# Patient Record
Sex: FEMALE | Race: WHITE
[De-identification: names, ages, dates, MRNs, and addresses within clinical notes are randomized per-mention and may not be internally consistent; named-entity substitution may affect disease eponyms.]

---

## 2017-03-27 ENCOUNTER — HOSPITAL ENCOUNTER (EMERGENCY)
Dept: HOSPITAL 25 - UCCORT | Age: 52
Discharge: HOME | End: 2017-03-27
Payer: COMMERCIAL

## 2017-03-27 VITALS — SYSTOLIC BLOOD PRESSURE: 119 MMHG | DIASTOLIC BLOOD PRESSURE: 78 MMHG

## 2017-03-27 DIAGNOSIS — Z88.1: ICD-10-CM

## 2017-03-27 DIAGNOSIS — N39.0: Primary | ICD-10-CM

## 2017-03-27 DIAGNOSIS — Z88.5: ICD-10-CM

## 2017-03-27 DIAGNOSIS — Z87.440: ICD-10-CM

## 2017-03-27 DIAGNOSIS — R31.9: ICD-10-CM

## 2017-03-27 PROCEDURE — 99212 OFFICE O/P EST SF 10 MIN: CPT

## 2017-03-27 PROCEDURE — 87186 SC STD MICRODIL/AGAR DIL: CPT

## 2017-03-27 PROCEDURE — 87086 URINE CULTURE/COLONY COUNT: CPT

## 2017-03-27 PROCEDURE — 87077 CULTURE AEROBIC IDENTIFY: CPT

## 2017-03-27 PROCEDURE — G0463 HOSPITAL OUTPT CLINIC VISIT: HCPCS

## 2017-03-27 PROCEDURE — 81003 URINALYSIS AUTO W/O SCOPE: CPT

## 2017-03-27 NOTE — UC
Complaint Female HPI





- HPI Summary


HPI Summary: 





complaint of pain with urination that started 2 daysa go


increased frequency and urgency


urine is foul smelling


blood in urine


 currently seeing Dr Mistry urologist for 


frequent UTI


denies vaginal discharge, fever, abdmonial pain, n]back pain


last UTI 1/2017- took cipro


 taking pyridium





- History Of Current Complaint


Chief Complaint: UCGU


Stated Complaint: URINARY COMPLAINT


Time Seen by Provider: 03/27/17 08:20


Hx Obtained From: Patient


Hx Last Menstrual Period: n/a


Onset/Duration: Sudden Onset


Associated Signs And Symptoms: Positive: Negative





- Allergies/Home Medications


Allergies/Adverse Reactions: 


 Allergies











Allergy/AdvReac Type Severity Reaction Status Date / Time


 


Bee Venom Allergy Severe Swelling Verified 03/27/17 08:23


 


Clarithromycin [From Biaxin] AdvReac Unknown Vomiting Verified 03/27/17 08:23


 


Codeine AdvReac  Vomiting Verified 03/27/17 08:23











Home Medications: 


 Home Medications





Phenazopyridine HCl [Uristat] 190 mg PO TID PRN 03/27/17 [History Confirmed 03/ 27/17]











PMH/Surg Hx/FS Hx/Imm Hx


Previously Healthy: Yes - frequent UTI's


Endocrine History Of: 


   Denies: Diabetes


GI/ History Of: 


   Denies: Gastroesophageal Reflux


Psychological History Of: Reports: Anxiety





- Surgical History


Surgical History: Yes


Surgery Procedure, Year, and Place: Uterine Ablation, 2012





- Family History


Known Family History: 


   Negative: Cardiac Disease, Hypertension, Diabetes, Renal Disease





- Social History


Occupation: Employed Full-time


Lives: With Family


Alcohol Use: Rare


Substance Use Type: None


Smoking Status (MU): Never Smoked Tobacco





- Immunization History


Most Recent Influenza Vaccination: Not the 2015/2016 Season





Review of Systems


Constitutional: Negative


Skin: Negative


Eyes: Negative


ENT: Negative


Respiratory: Negative


Cardiovascular: Negative


Gastrointestinal: Negative


Genitourinary: Dysuria, Hematuria, Frequency, Urgency


Motor: Negative


Neurovascular: Negative


Musculoskeletal: Negative


Neurological: Negative


Psychological: Negative


All Other Systems Reviewed And Are Negative: Yes





Physical Exam


Triage Information Reviewed: Yes


Appearance: No Pain Distress, Well-Nourished


Vital Signs: 


 Initial Vital Signs











Temp  97.8 F   03/27/17 08:21


 


Pulse  70   03/27/17 08:21


 


Resp  16   03/27/17 08:21


 


BP  119/78   03/27/17 08:21


 


Pulse Ox  99   03/27/17 08:21











Vital Signs Reviewed: Yes


Eyes: Positive: Conjunctiva Clear


ENT: Positive: Pharynx normal, TMs normal.  Negative: Nasal congestion


Neck: Positive: No Lymphadenopathy


Respiratory: Positive: Lungs clear, Normal breath sounds, No respiratory 

distress


Cardiovascular: Positive: RRR, No Murmur, Pulses Normal


Abdomen Description: Positive: Nontender, No Organomegaly, Soft.  Negative: CVA 

Tenderness (R), CVA Tenderness (L), Distended, Guarding


Bowel Sounds: Positive: Present


Musculoskeletal: Positive: No Edema


Neurological: Positive: Alert


Psychological Exam: Normal


Skin Exam: Normal





 Complaint Female Dx





- Course


Course Of Treatment: exam completed.  last urine culture show suseptability to 

macrobid





- Differential Dx/Diagnosis


Differential Diagnosis/HQI/PQRI: Ureteral Stone, Urinary Tract Infection


Provider Diagnoses: UTI





Discharge





- Discharge Plan


Condition: Stable


Disposition: HOME


Prescriptions: 


Nitrofurantoin Monohyd Macro [Macrobid] 100 mg PO BID #14 cap


Phenazopyridine TAB* [Pyridium 100 mg TAB*] 100 mg PO TID #6 tab


Patient Education Materials:  Urinary Tract Infection in Women (ED)


Referrals: 


Sander Mejía MD [Primary Care Provider] - 


Additional Instructions: 


Start antibiotic and pyridium as directed 


 Increase fluids and rest


 Take acetaminophen for fever or pain


Please review your discharge instructions. 


If your symptoms do not improve please call your primary care provider or 

return to urgent care

## 2017-06-29 ENCOUNTER — HOSPITAL ENCOUNTER (EMERGENCY)
Dept: HOSPITAL 25 - UCCORT | Age: 52
Discharge: HOME | End: 2017-06-29
Payer: COMMERCIAL

## 2017-06-29 VITALS — SYSTOLIC BLOOD PRESSURE: 119 MMHG | DIASTOLIC BLOOD PRESSURE: 75 MMHG

## 2017-06-29 DIAGNOSIS — M79.89: Primary | ICD-10-CM

## 2017-06-29 PROCEDURE — G0463 HOSPITAL OUTPT CLINIC VISIT: HCPCS

## 2017-06-29 PROCEDURE — 73140 X-RAY EXAM OF FINGER(S): CPT

## 2017-06-29 PROCEDURE — 99212 OFFICE O/P EST SF 10 MIN: CPT

## 2017-06-29 PROCEDURE — 86618 LYME DISEASE ANTIBODY: CPT

## 2017-06-29 NOTE — UC
Hand/Wrist HPI





- HPI Summary


HPI Summary: 





left swollen index finger





- History Of Current Complaint


Chief Complaint: UCUpperExtremity


Stated Complaint: SWOLLEN INDEX FINGER-LFT


Time Seen by Provider: 06/29/17 20:56


Hx Obtained From: Patient


Hx Last Menstrual Period: n/a


Pregnant?: No


Mechanism Of Injury: unknown


Onset/Duration: Sudden Onset, Lasting Days - 4, Still Present


Severity Initially: Mild


Severity Currently: Moderate


Pain Intensity: 7


Pain Scale Used: 0-10 Numeric


Character Of Pain: Throbbing


Aggravating Factor(s): Movement


Alleviating: Nothing


Associated Signs And Symptoms: Positive: Swelling, Redness





- Allergies/Home Medications


Allergies/Adverse Reactions: 


 Allergies











Allergy/AdvReac Type Severity Reaction Status Date / Time


 


Bee Venom Allergy Severe Swelling Verified 06/29/17 20:42


 


Clarithromycin [From Biaxin] AdvReac Unknown Vomiting Verified 06/29/17 20:42


 


Codeine AdvReac  Vomiting Verified 06/29/17 20:42














PMH/Surg Hx/FS Hx/Imm Hx


Previously Healthy: Yes





- Surgical History


Surgical History: Yes


Surgery Procedure, Year, and Place: Uterine Ablation, 2012





- Family History


Known Family History: 


   Negative: Cardiac Disease, Hypertension, Diabetes, Renal Disease





- Social History


Occupation: Employed Full-time


Lives: With Family


Alcohol Use: Rare


Substance Use Type: None


Smoking Status (MU): Never Smoked Tobacco





- Immunization History


Most Recent Influenza Vaccination: Not the 2015/2016 Season





Review of Systems


Constitutional: Negative


Skin: Negative


Eyes: Negative


ENT: Negative


Respiratory: Negative


Cardiovascular: Negative


Gastrointestinal: Negative


Genitourinary: Negative


Motor: Negative, Decreased ROM - left index finger


Neurovascular: Negative


Musculoskeletal: Negative, Edema - left index finger


Neurological: Negative


Psychological: Negative


All Other Systems Reviewed And Are Negative: Yes





Physical Exam


Triage Information Reviewed: Yes


Appearance: Well-Appearing, No Pain Distress, Well-Nourished


Vital Signs: 


 Initial Vital Signs











Temp  97.8 F   06/29/17 20:36


 


Pulse  88   06/29/17 20:36


 


Resp  20   06/29/17 20:36


 


BP  119/75   06/29/17 20:36


 


Pulse Ox  98   06/29/17 20:36











Vital Signs Reviewed: Yes


Eye Exam: Normal


Eyes: Positive: Conjunctiva Clear


ENT Exam: Normal


ENT: Positive: Normal ENT inspection, Hearing grossly normal.  Negative: Nasal 

congestion, Nasal drainage, Tonsillar swelling, Tonsillar exudate


Dental Exam: Normal


Neck exam: Normal


Neck: Positive: Supple, Nontender


Respiratory Exam: Normal


Respiratory: Positive: Chest non-tender, No respiratory distress, No accessory 

muscle use


Cardiovascular Exam: Normal


Cardiovascular: Positive: RRR, Pulses Normal, Brisk Capillary Refill


Musculoskeletal Exam: Other - left index finger


Musculoskeletal: Positive: Strength Limited @, ROM Limited @, Edema @


Neurological Exam: Normal


Psychological Exam: Normal


Skin Exam: Normal





Hand/Wrist Course/Dx





- Course


Course Of Treatment: splint, ibuprofen, keflex follow with pcp  lyme serology





- Differential Dx/Diagnosis


Differential Diagnosis/HQI/PQRI: Cellulitis, Fracture, Tendonitis


Provider Diagnoses: Cellulitis vs Tendonitis left index finger





Discharge





- Discharge Plan


Condition: Stable


Disposition: HOME


Prescriptions: 


Cephalexin CAP* [Keflex CAP*] 500 mg PO QID #28 cap


Patient Education Materials:  Ibuprofen (By mouth), Cellulitis (ED), Tendinitis 

(ED), Warm Compress or Soak (ED)


Referrals: 


Sander Mejía MD [Primary Care Provider] - 5 Days

## 2017-06-29 NOTE — RAD
Indication: Tender and swollen LEFT second finger without proceeding injury.



Comparison: No relevant prior exams available on the [facility] PACS for comparison.



Technique: 3 views LEFT second finger



REPORT AND IMPRESSION:  Nonfocal soft tissue swelling. No conspicuous foreign body or

subcutaneous emphysema. Negative for fracture, malalignment, or significant arthropathic

change.

## 2017-09-17 ENCOUNTER — HOSPITAL ENCOUNTER (EMERGENCY)
Dept: HOSPITAL 25 - UCCORT | Age: 52
Discharge: HOME | End: 2017-09-17
Payer: COMMERCIAL

## 2017-09-17 VITALS — DIASTOLIC BLOOD PRESSURE: 77 MMHG | SYSTOLIC BLOOD PRESSURE: 127 MMHG

## 2017-09-17 DIAGNOSIS — T63.441A: Primary | ICD-10-CM

## 2017-09-17 DIAGNOSIS — Z88.5: ICD-10-CM
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
## 2017-09-17 DIAGNOSIS — Z88.1: ICD-10-CM

## 2017-09-17 PROCEDURE — G0463 HOSPITAL OUTPT CLINIC VISIT: HCPCS

## 2017-09-17 PROCEDURE — 99212 OFFICE O/P EST SF 10 MIN: CPT

## 2017-09-17 NOTE — UC
Allergic Reaction HPI





- HPI Summary


HPI Summary: 





patient was stung by 4 bees around 3pm, did take 2 benadr;y, has had 

anaphylaxtic reaction in the past.





- History of Current Complaint


Chief Complaint: UCSkin


Stated Complaint: BEE STING


Time Seen by Provider: 09/17/17 19:36


Hx Obtained From: Patient


Hx Last Menstrual Period: NONE


Pregnant?: No


Onset/Duration: Sudden Onset, Lasting Hours


Severity Initially: Mild


Severity Currently: Mild


Character: Swelling, Pruritus, Hives


Aggrevating Factor(s): Heat


Alleviating Factor(s): Cold, Antihistamines


Associated Signs And Symptoms: Positive: Other: - redness and swelling





- Allergies/Home Medications


Allergies/Adverse Reactions: 


 Allergies











Allergy/AdvReac Type Severity Reaction Status Date / Time


 


Bee Venom Allergy Severe Swelling Verified 09/17/17 19:22


 


Clarithromycin [From Biaxin] AdvReac Unknown Vomiting Verified 09/17/17 19:22


 


Codeine AdvReac  Vomiting Verified 09/17/17 19:22











Home Medications: 


 Home Medications





Diphenhydramine HCl [Benadryl Allergy 25 MG TAB] 50 mg PO Q6H PRN 09/17/17 [

History Confirmed 09/17/17]











PMH/Surg Hx/FS Hx/Imm Hx


Previously Healthy: Yes





- Surgical History


Surgical History: Yes


Surgery Procedure, Year, and Place: Uterine Ablation, 2012





- Family History


Known Family History: 


   Negative: Cardiac Disease, Hypertension, Diabetes, Renal Disease





- Social History


Alcohol Use: Rare


Substance Use Type: None


Smoking Status (MU): Never Smoked Tobacco





- Immunization History


Most Recent Influenza Vaccination: Not the 2015/2016 Season





Review of Systems


Constitutional: Negative


Skin: Other - redness and swelling


Eyes: Negative


ENT: Negative


Respiratory: Negative


Cardiovascular: Negative


Genitourinary: Negative


Motor: Negative


Neurovascular: Negative


Musculoskeletal: Negative


Neurological: Negative


Psychological: Negative


Is Patient Immunocompromised?: No


All Other Systems Reviewed And Are Negative: Yes





Physical Exam


Triage Information Reviewed: Yes


Appearance: Well-Appearing, Well-Nourished, Pain Distress


Vital Signs: 


 Initial Vital Signs











Temp  98.5 F   09/17/17 19:12


 


Pulse  79   09/17/17 19:12


 


Resp  16   09/17/17 19:12


 


BP  127/77   09/17/17 19:12


 


Pulse Ox  98   09/17/17 19:12











Vital Signs Reviewed: Yes


Eye Exam: Normal


Eyes: Positive: Conjunctiva Clear


ENT: Positive: Pharyngeal erythema, TMs normal


Dental Exam: Normal


Neck exam: Normal


Neck: Positive: Supple, Nontender, No Lymphadenopathy


Respiratory Exam: Normal


Respiratory: Positive: Chest non-tender, Lungs clear, Normal breath sounds


Cardiovascular Exam: Normal


Cardiovascular: Positive: RRR, No Murmur, Pulses Normal


Abdominal Exam: Normal


Abdomen Description: Positive: Nontender, No Organomegaly, Soft


Bowel Sounds: Positive: Present


Musculoskeletal Exam: Normal


Musculoskeletal: Positive: Strength Intact, ROM Intact, No Edema


Neurological Exam: Normal


Neurological: Positive: Alert, Muscle Tone Normal


Psychological Exam: Normal


Skin: Positive: Other - large red area on inner right thigh, 2 on the hip and 

one on the stomach,





Allergic Reaction Course/Dx





- Course


Course Of Treatment: hx obtained, exam performed, meds reviewed, given meds for 

allergic reaction





- Differential Dx/Diagnosis


Differential Diagnosis/HQI/PQRI: Airway Obstruction, Anaphylaxis, Bronchospasm, 

Local Allergic Reaction, Urticaria


Provider Diagnoses: loaclized allergic reaction of bee stings





Discharge





- Discharge Plan


Condition: Stable


Disposition: HOME


Prescriptions: 


Epinephrine [Epipen 2-Addison] 0.3 mg IM ONCE #1 inj


Famotidine TAB* [Pepcid 20 MG TAB*] 20 mg PO DAILY #7 tab


predniSONE TAB* [Deltasone TAB*] 40 mg PO DAILY #14 tab


Patient Education Materials:  General Allergic Reaction (ED)


Referrals: 


Sander Mejía MD [Primary Care Provider] - 


Additional Instructions: 


1. take the medication as prescribed.


2. COntinue with benadryl as needed


3. Follow up with any worsening respiratory symptoms, increased redness or 

swelling

## 2017-09-21 ENCOUNTER — HOSPITAL ENCOUNTER (EMERGENCY)
Dept: HOSPITAL 25 - UCCORT | Age: 52
Discharge: HOME | End: 2017-09-21
Payer: COMMERCIAL

## 2017-09-21 VITALS — DIASTOLIC BLOOD PRESSURE: 65 MMHG | SYSTOLIC BLOOD PRESSURE: 104 MMHG

## 2017-09-21 DIAGNOSIS — R21: ICD-10-CM

## 2017-09-21 DIAGNOSIS — Z88.5: ICD-10-CM

## 2017-09-21 DIAGNOSIS — Z91.030: ICD-10-CM

## 2017-09-21 DIAGNOSIS — T63.441D: Primary | ICD-10-CM

## 2017-09-21 DIAGNOSIS — Z88.1: ICD-10-CM

## 2017-09-21 PROCEDURE — G0463 HOSPITAL OUTPT CLINIC VISIT: HCPCS

## 2017-09-21 PROCEDURE — 99211 OFF/OP EST MAY X REQ PHY/QHP: CPT

## 2017-09-21 NOTE — UC
Skin Complaint HPI





- HPI Summary


HPI Summary: 





SEEN HER ON 9/17/17 FOR BEE STINGS. ONE ON UPPER LEFT THIGH , ONE ON RIGHT HIP 

AND STOMACH. DOES NOT FEEL LIKE THEY ARE IMPROVING. BITES CONTINUE TO BE ITCHY 

AND NOW THEY FEEL WARM. She is having significant itching. No fever. No 

spreading of the redness. No discharge. No SOB , [place] or CP 


[ End ]





- History of Current Complaint


Chief Complaint: UCSkin


Time Seen by Provider: 09/21/17 09:40


Stated Complaint: RE-CHECK BEE STING


Hx Obtained From: Patient


Hx Last Menstrual Period: NONE


Onset/Duration: Sudden Onset


Onset Severity: Mild


Current Severity: None


Character: Pruritus


Associated Signs & Symptoms: Positive: Negative


Related History: Possible Reaction to: Insect





- Allergy/Home Medications


Allergies/Adverse Reactions: 


 Allergies











Allergy/AdvReac Type Severity Reaction Status Date / Time


 


Bee Venom Allergy Severe Swelling Verified 09/21/17 09:18


 


Clarithromycin [From Biaxin] AdvReac Unknown Vomiting Verified 09/21/17 09:18


 


Codeine AdvReac  Vomiting Verified 09/21/17 09:18














Review of Systems


Constitutional: Negative


Skin: Negative, Other - bee stings


Eyes: Negative


ENT: Negative


Respiratory: Negative


Cardiovascular: Negative


Gastrointestinal: Negative


Genitourinary: Negative


Motor: Negative


Neurovascular: Negative


Musculoskeletal: Negative


Neurological: Negative


Psychological: Negative


All Other Systems Reviewed And Are Negative: Yes





PMH/Surg Hx/FS Hx/Imm Hx


Previously Healthy: Yes





- Surgical History


Surgical History: Yes


Surgery Procedure, Year, and Place: Uterine Ablation, 2012





- Family History


Known Family History: 


   Negative: Cardiac Disease, Hypertension, Diabetes, Renal Disease





- Social History


Occupation: Employed Full-time


Alcohol Use: Rare


Substance Use Type: None


Smoking Status (MU): Never Smoked Tobacco





- Immunization History


Most Recent Influenza Vaccination: Not the 2015/2016 Season





Physical Exam


Triage Information Reviewed: Yes


Appearance: Well-Appearing, No Pain Distress, Well-Nourished


Vital Signs: 


 Initial Vital Signs











Temp  98.3 F   09/21/17 09:20


 


Pulse  77   09/21/17 09:20


 


Resp  16   09/21/17 09:20


 


BP  104/65   09/21/17 09:20


 


Pulse Ox  98   09/21/17 09:20











Vital Signs Reviewed: Yes


Eye Exam: Normal


ENT Exam: Normal


Neck exam: Normal


Respiratory Exam: Normal


Cardiovascular Exam: Normal


Neurological Exam: Normal


Psychological Exam: Normal


Skin: Positive: Other - left inner thigh with macular red lesion 2x2 cm, no 

fluctuance or drainage. also on the right lateral hip with 1x1 cm macular red 

urticarial type lesion





Course/Dx





- Course


Course Of Treatment: No acute concerns for cellulitis, no fever, raised 

spreading redness, but if spreads tomorrow and any concerns can call office 

tomorrow evening and I can send in antibiotics but otherwise no acute concerns 

and treat supportively with steroids and creams





- Diagnoses


Provider Diagnoses: bee sting left leg and right hip





Discharge





- Discharge Plan


Condition: Good


Disposition: HOME


Prescriptions: 


Triamcinolone 0.1% CREAM(NF) [Kenalog Cream 0.1%(NF)] 1 applic TOPICAL TID #1 

tube


Patient Education Materials:  Insect Bite or Sting (ED)


Referrals: 


Sander Mejía MD [Primary Care Provider] - If Needed

## 2017-12-10 ENCOUNTER — HOSPITAL ENCOUNTER (EMERGENCY)
Dept: HOSPITAL 25 - UCCORT | Age: 52
Discharge: HOME | End: 2017-12-10
Payer: COMMERCIAL

## 2017-12-10 VITALS — DIASTOLIC BLOOD PRESSURE: 70 MMHG | SYSTOLIC BLOOD PRESSURE: 118 MMHG

## 2017-12-10 DIAGNOSIS — J32.9: Primary | ICD-10-CM

## 2017-12-10 DIAGNOSIS — Z88.4: ICD-10-CM

## 2017-12-10 DIAGNOSIS — Z88.5: ICD-10-CM

## 2017-12-10 DIAGNOSIS — Z91.030: ICD-10-CM

## 2017-12-10 PROCEDURE — G0463 HOSPITAL OUTPT CLINIC VISIT: HCPCS

## 2017-12-10 PROCEDURE — 99212 OFFICE O/P EST SF 10 MIN: CPT

## 2017-12-10 NOTE — UC
Throat Pain/Nasal Narciso HPI





- HPI Summary


HPI Summary: 





Pt c/o nasal congestion, fever, chills, sinus pressure and pain, X 2 weeks. 





- History of Current Complaint


Chief Complaint: UCRespiratory


Stated Complaint: SINUS


Time Seen by Provider: 12/10/17 17:10


Hx Obtained From: Patient


Hx Last Menstrual Period: NONE


Pregnant?: No


Onset/Duration: Gradual Onset, Lasting Weeks - 2, Still Present


Severity: Moderate


Associated Signs & Symptoms: Positive: Sinus Discomfort





- Allergies/Home Medications


Allergies/Adverse Reactions: 


 Allergies











Allergy/AdvReac Type Severity Reaction Status Date / Time


 


Bee Venom Allergy Severe Swelling Verified 12/10/17 17:10


 


Clarithromycin [From Biaxin] AdvReac Unknown Vomiting Verified 12/10/17 17:10


 


Codeine AdvReac  Vomiting Verified 12/10/17 17:10











Home Medications: 


 Home Medications





Epinephrine [Epipen 2-Addison] 0.3 mg IM 12/10/17 [History]











PMH/Surg Hx/FS Hx/Imm Hx


Previously Healthy: Yes





- Surgical History


Surgical History: Yes


Surgery Procedure, Year, and Place: Uterine Ablation, 2012





- Family History


Known Family History: 


   Negative: Cardiac Disease, Hypertension, Diabetes, Renal Disease





- Social History


Occupation: Employed Full-time


Lives: With Family


Alcohol Use: None


Substance Use Type: None


Smoking Status (MU): Never Smoked Tobacco


Have You Smoked in the Last Year: No





- Immunization History


Most Recent Influenza Vaccination: 2017


Vaccination Up to Date: Yes





Review of Systems


Constitutional: Fever, Chills, Fatigue


Skin: Negative


Eyes: Negative


ENT: Sinus Congestion, Sinus Pain/Tenderness


Respiratory: Cough


Cardiovascular: Negative


Gastrointestinal: Negative


Genitourinary: Negative


Motor: Negative


Neurovascular: Negative


Musculoskeletal: Negative


Neurological: Headache


Psychological: Negative


Is Patient Immunocompromised?: No


All Other Systems Reviewed And Are Negative: Yes





Physical Exam


Triage Information Reviewed: Yes


Appearance: Ill-Appearing


Vital Signs: 


 Initial Vital Signs











Temp  98.4 F   12/10/17 17:11


 


Pulse  76   12/10/17 17:11


 


Resp  16   12/10/17 17:11


 


BP  118/70   12/10/17 17:11


 


Pulse Ox  98   12/10/17 17:11











Vital Signs Reviewed: Yes


Eye Exam: Normal


ENT Exam: Other


ENT: Positive: Nasal congestion, Sinus tenderness


Dental Exam: Normal


Neck exam: Normal


Respiratory Exam: Normal


Cardiovascular Exam: Normal


Musculoskeletal Exam: Normal


Neurological Exam: Normal


Psychological Exam: Normal


Skin Exam: Normal





Throat Pain/Nasal Course/Dx





- Differential Dx/Diagnosis


Differential Diagnosis/HQI/PQRI: Sinusitis, URI


Provider Diagnoses: sinusitis





Discharge





- Discharge Plan


Condition: Stable


Disposition: HOME


Prescriptions: 


Amoxicillin PO (*) [Amoxicillin 875 MG (*)] 875 mg PO Q12H #20 tab


Pseudoephedrine-Guaifenesin [Mucinex D  mg] 1 tab PO DAILY #10 tab


Patient Education Materials:  Sinusitis (ED)


Referrals: 


Sander Mejía MD [Primary Care Provider] - If Needed

## 2018-03-07 ENCOUNTER — HOSPITAL ENCOUNTER (EMERGENCY)
Dept: HOSPITAL 25 - UCCORT | Age: 53
Discharge: HOME | End: 2018-03-07
Payer: COMMERCIAL

## 2018-03-07 VITALS — DIASTOLIC BLOOD PRESSURE: 73 MMHG | SYSTOLIC BLOOD PRESSURE: 118 MMHG

## 2018-03-07 DIAGNOSIS — N39.0: Primary | ICD-10-CM

## 2018-03-07 DIAGNOSIS — Z88.5: ICD-10-CM

## 2018-03-07 DIAGNOSIS — Z87.440: ICD-10-CM

## 2018-03-07 DIAGNOSIS — Z88.1: ICD-10-CM

## 2018-03-07 DIAGNOSIS — Z91.030: ICD-10-CM

## 2018-03-07 DIAGNOSIS — B96.20: ICD-10-CM

## 2018-03-07 PROCEDURE — G0463 HOSPITAL OUTPT CLINIC VISIT: HCPCS

## 2018-03-07 PROCEDURE — 99212 OFFICE O/P EST SF 10 MIN: CPT

## 2018-03-07 PROCEDURE — 87077 CULTURE AEROBIC IDENTIFY: CPT

## 2018-03-07 PROCEDURE — 87086 URINE CULTURE/COLONY COUNT: CPT

## 2018-03-07 PROCEDURE — 87186 SC STD MICRODIL/AGAR DIL: CPT

## 2018-03-07 NOTE — UC
Complaint Female HPI





- HPI Summary


HPI Summary: 





She has hx of frequent UTi and these symptoms feel typical for her. macrobid 

has helped in the past. She is followed by Dr. Saavedra who has done a lot of 

testing. She has had dysuria, frequency and urgency. No fevers or vomiting. 





- History Of Current Complaint


Chief Complaint: UCGU


Stated Complaint: URINARY


Time Seen by Provider: 03/07/18 07:17


Hx Obtained From: Patient


Hx Last Menstrual Period: NONE


Onset/Duration: Gradual Onset, Lasting Days


Timing: Constant


Severity Initially: Moderate


Severity Currently: Moderate


Pain Intensity: 7


Character: Sharp, Burning


Aggravating Factor(s): Urination


Alleviating Factor(s): Nothing


Associated Signs And Symptoms: Positive: Negative


Related Hx: Similar Episode/Dx as: - UTI.





- Allergies/Home Medications


Allergies/Adverse Reactions: 


 Allergies











Allergy/AdvReac Type Severity Reaction Status Date / Time


 


bee venom protein (honey bee) Allergy  Swelling Verified 03/07/18 07:36


 


clarithromycin Allergy  Vomiting Verified 03/07/18 07:36


 


codeine Allergy  Vomiting Verified 03/07/18 07:36











Home Medications: 


 Home Medications





Uricalm 2 tab PO ONCE PRN 03/07/18 [History]











PMH/Surg Hx/FS Hx/Imm Hx


Previously Healthy: No - frequency uti. 





- Surgical History


Surgical History: Yes


Surgery Procedure, Year, and Place: Uterine Ablation, 2012





- Family History


Known Family History: 


   Negative: Cardiac Disease, Hypertension, Diabetes, Renal Disease





- Social History


Lives: With Family


Alcohol Use: None


Substance Use Type: None


Smoking Status (MU): Never Smoked Tobacco


Have You Smoked in the Last Year: No





- Immunization History


Most Recent Influenza Vaccination: 2017


Vaccination Up to Date: Yes





Review of Systems


Genitourinary: Dysuria, Frequency


All Other Systems Reviewed And Are Negative: Yes





Physical Exam


Triage Information Reviewed: Yes


Appearance: Well-Appearing, No Pain Distress, Well-Nourished


Vital Signs: 


 Initial Vital Signs











Temp  98.5 F   03/07/18 07:38


 


Pulse  86   03/07/18 07:38


 


Resp  16   03/07/18 07:38


 


BP  118/73   03/07/18 07:38


 


Pulse Ox  96   03/07/18 07:38











Vital Signs Reviewed: Yes


Eye Exam: Normal


Eyes: Positive: Conjunctiva Clear


ENT: Positive: Normal ENT inspection


Neck: Positive: Supple, Nontender, No Lymphadenopathy


Respiratory: Positive: Lungs clear, Normal breath sounds, No respiratory 

distress, No accessory muscle use.  Negative: Respiratory distress, Decreased 

breath sounds, Accessory muscle use, Crackles, Rhonchi, Stridor, Wheezing


Cardiovascular: Positive: No Murmur, Pulses Normal


Abdomen Description: Positive: No Organomegaly, Soft.  Negative: CVA Tenderness 

(R), CVA Tenderness (L), Distended, Guarding


Musculoskeletal: Positive: Strength Intact, ROM Intact, No Edema


Neurological: Positive: Alert, Muscle Tone Normal.  Negative: Fatigued


Psychological: Positive: Age Appropriate Behavior


Skin: Negative: rashes





 Complaint Female Dx





- Differential Dx/Diagnosis


Provider Diagnoses: uti





Discharge





- Discharge Plan


Condition: Good


Disposition: HOME


Prescriptions: 


Nitrofurantoin Macrocrystals* [Macrodantin*] 100 mg PO BID #20 cap


Patient Education Materials:  Dysuria (ED)


Referrals: 


Sander Mejía MD [Primary Care Provider] -

## 2019-02-02 ENCOUNTER — TRANSCRIPTION ENCOUNTER (OUTPATIENT)
Age: 54
End: 2019-02-02

## 2019-02-15 ENCOUNTER — TRANSCRIPTION ENCOUNTER (OUTPATIENT)
Age: 54
End: 2019-02-15

## 2019-04-08 ENCOUNTER — APPOINTMENT (OUTPATIENT)
Dept: FAMILY MEDICINE | Facility: CLINIC | Age: 54
End: 2019-04-08
Payer: COMMERCIAL

## 2019-04-08 VITALS
OXYGEN SATURATION: 98 % | TEMPERATURE: 98 F | HEART RATE: 68 BPM | RESPIRATION RATE: 12 BRPM | BODY MASS INDEX: 25.16 KG/M2 | SYSTOLIC BLOOD PRESSURE: 114 MMHG | WEIGHT: 151 LBS | DIASTOLIC BLOOD PRESSURE: 70 MMHG | HEIGHT: 65 IN

## 2019-04-08 DIAGNOSIS — Z78.9 OTHER SPECIFIED HEALTH STATUS: ICD-10-CM

## 2019-04-08 DIAGNOSIS — Z87.448 PERSONAL HISTORY OF OTHER DISEASES OF URINARY SYSTEM: ICD-10-CM

## 2019-04-08 DIAGNOSIS — Z00.00 ENCOUNTER FOR GENERAL ADULT MEDICAL EXAMINATION W/OUT ABNORMAL FINDINGS: ICD-10-CM

## 2019-04-08 DIAGNOSIS — R31.9 HEMATURIA, UNSPECIFIED: ICD-10-CM

## 2019-04-08 DIAGNOSIS — Z82.49 FAMILY HISTORY OF ISCHEMIC HEART DISEASE AND OTHER DISEASES OF THE CIRCULATORY SYSTEM: ICD-10-CM

## 2019-04-08 DIAGNOSIS — Z82.3 FAMILY HISTORY OF STROKE: ICD-10-CM

## 2019-04-08 PROCEDURE — 99203 OFFICE O/P NEW LOW 30 MIN: CPT

## 2019-04-08 RX ORDER — SULFACETAMIDE SODIUM 100 MG/ML
10 SOLUTION OPHTHALMIC
Qty: 15 | Refills: 0 | Status: ACTIVE | COMMUNITY
Start: 2019-02-02

## 2019-04-08 RX ORDER — SERTRALINE HYDROCHLORIDE 100 MG/1
100 TABLET, FILM COATED ORAL
Qty: 90 | Refills: 0 | Status: ACTIVE | COMMUNITY
Start: 2019-03-20

## 2019-04-08 RX ORDER — FLUTICASONE PROPIONATE 50 UG/1
50 SPRAY, METERED NASAL
Qty: 16 | Refills: 0 | Status: ACTIVE | COMMUNITY
Start: 2019-02-15

## 2019-04-08 RX ORDER — NEOMYCIN AND POLYMYXIN B SULFATES AND DEXAMETHASONE 3.5; 10000; 1 MG/G; [IU]/G; MG/G
3.5-10000-0.1 OINTMENT OPHTHALMIC
Qty: 4 | Refills: 0 | Status: ACTIVE | COMMUNITY
Start: 2019-02-14

## 2019-04-10 NOTE — HISTORY OF PRESENT ILLNESS
[FreeTextEntry8] : New patient transfer from [facility] needs urology for chronic stones and occasional hematuria, no active pain or bleeding at this time would like GYN and Urologist

## 2019-04-10 NOTE — PLAN
[FreeTextEntry1] : New patient transfer from [facility] needs urology for chronic stones and occasional hematuria, no active pain or bleeding at this time would like GYN and Urologist \par Labs and care plan reviewed referrals given advised old records and shot records and advised labs

## 2019-04-18 ENCOUNTER — TRANSCRIPTION ENCOUNTER (OUTPATIENT)
Age: 54
End: 2019-04-18

## 2019-04-22 ENCOUNTER — TRANSCRIPTION ENCOUNTER (OUTPATIENT)
Age: 54
End: 2019-04-22

## 2019-09-29 ENCOUNTER — TRANSCRIPTION ENCOUNTER (OUTPATIENT)
Age: 54
End: 2019-09-29

## 2019-10-10 ENCOUNTER — APPOINTMENT (OUTPATIENT)
Dept: FAMILY MEDICINE | Facility: CLINIC | Age: 54
End: 2019-10-10
Payer: COMMERCIAL

## 2019-10-10 VITALS
HEART RATE: 74 BPM | WEIGHT: 151 LBS | DIASTOLIC BLOOD PRESSURE: 70 MMHG | HEIGHT: 65 IN | BODY MASS INDEX: 25.16 KG/M2 | OXYGEN SATURATION: 98 % | RESPIRATION RATE: 13 BRPM | SYSTOLIC BLOOD PRESSURE: 118 MMHG

## 2019-10-10 DIAGNOSIS — N20.0 CALCULUS OF KIDNEY: ICD-10-CM

## 2019-10-10 DIAGNOSIS — F41.8 OTHER SPECIFIED ANXIETY DISORDERS: ICD-10-CM

## 2019-10-10 DIAGNOSIS — R00.2 PALPITATIONS: ICD-10-CM

## 2019-10-10 DIAGNOSIS — E55.9 VITAMIN D DEFICIENCY, UNSPECIFIED: ICD-10-CM

## 2019-10-10 DIAGNOSIS — E78.2 MIXED HYPERLIPIDEMIA: ICD-10-CM

## 2019-10-10 PROCEDURE — G0444 DEPRESSION SCREEN ANNUAL: CPT

## 2019-10-10 PROCEDURE — G0442 ANNUAL ALCOHOL SCREEN 15 MIN: CPT

## 2019-10-10 PROCEDURE — 93000 ELECTROCARDIOGRAM COMPLETE: CPT | Mod: 59

## 2019-10-10 PROCEDURE — 99214 OFFICE O/P EST MOD 30 MIN: CPT | Mod: 25

## 2019-10-10 RX ORDER — ERGOCALCIFEROL 1.25 MG/1
1.25 MG CAPSULE, LIQUID FILLED ORAL
Qty: 12 | Refills: 5 | Status: ACTIVE | COMMUNITY
Start: 2019-10-10 | End: 1900-01-01

## 2019-10-10 RX ORDER — EZETIMIBE 10 MG/1
10 TABLET ORAL
Qty: 90 | Refills: 0 | Status: ACTIVE | COMMUNITY
Start: 2019-10-10 | End: 1900-01-01

## 2019-10-10 NOTE — HEALTH RISK ASSESSMENT
[Yes] : Yes [] : No [2 - 4 times a month (2 pts)] : 2-4 times a month (2 points) [1 or 2 (0 pts)] : 1 or 2 (0 points) [Never (0 pts)] : Never (0 points) [No] : In the past 12 months have you used drugs other than those required for medical reasons? No [No falls in past year] : Patient reported no falls in the past year [0] : 2) Feeling down, depressed, or hopeless: Not at all (0) [Audit-CScore] : 2 [[id number]] : 0 [[id number]] : 0

## 2019-10-10 NOTE — COUNSELING
[Fall prevention counseling provided] : Fall prevention counseling provided [Use proper foot wear] : Use proper foot wear [Potential consequences of obesity discussed] : Potential consequences of obesity discussed [Benefits of weight loss discussed] : Benefits of weight loss discussed [Encouraged to maintain food diary] : Encouraged to maintain food diary [Encouraged to increase physical activity] : Encouraged to increase physical activity [None] : None [Good understanding] : Patient has a good understanding of lifestyle changes and steps needed to achieve self management goal

## 2019-10-10 NOTE — PLAN
[FreeTextEntry1] : Patient in for follow up of severe dyslipidemia and Low Vit D and Hx of palpitations \par \par Labs reviewed   Diet and meds RTC 1 month CMP

## 2019-10-10 NOTE — HISTORY OF PRESENT ILLNESS
[FreeTextEntry1] : Patient in for follow up of severe dyslipidemia and Low Vit D and Hx of palpitations

## 2019-11-11 ENCOUNTER — APPOINTMENT (OUTPATIENT)
Dept: FAMILY MEDICINE | Facility: CLINIC | Age: 54
End: 2019-11-11

## 2020-01-06 ENCOUNTER — RX RENEWAL (OUTPATIENT)
Age: 55
End: 2020-01-06

## 2020-04-06 ENCOUNTER — RX RENEWAL (OUTPATIENT)
Age: 55
End: 2020-04-06

## 2020-04-06 RX ORDER — ROSUVASTATIN CALCIUM 10 MG/1
10 TABLET, FILM COATED ORAL
Qty: 90 | Refills: 1 | Status: ACTIVE | COMMUNITY
Start: 2019-10-10 | End: 1900-01-01

## 2020-10-02 ENCOUNTER — RX RENEWAL (OUTPATIENT)
Age: 55
End: 2020-10-02

## 2020-10-27 ENCOUNTER — TRANSCRIPTION ENCOUNTER (OUTPATIENT)
Age: 55
End: 2020-10-27

## 2020-11-02 ENCOUNTER — RX RENEWAL (OUTPATIENT)
Age: 55
End: 2020-11-02

## 2020-12-26 ENCOUNTER — TRANSCRIPTION ENCOUNTER (OUTPATIENT)
Age: 55
End: 2020-12-26

## 2021-03-11 ENCOUNTER — RESULT REVIEW (OUTPATIENT)
Age: 56
End: 2021-03-11

## 2021-06-30 ENCOUNTER — TRANSCRIPTION ENCOUNTER (OUTPATIENT)
Age: 56
End: 2021-06-30

## 2022-04-08 ENCOUNTER — TRANSCRIPTION ENCOUNTER (OUTPATIENT)
Age: 57
End: 2022-04-08

## 2022-04-22 ENCOUNTER — RESULT REVIEW (OUTPATIENT)
Age: 57
End: 2022-04-22

## 2022-04-25 ENCOUNTER — TRANSCRIPTION ENCOUNTER (OUTPATIENT)
Age: 57
End: 2022-04-25

## 2022-09-18 ENCOUNTER — NON-APPOINTMENT (OUTPATIENT)
Age: 57
End: 2022-09-18

## 2023-01-22 ENCOUNTER — NON-APPOINTMENT (OUTPATIENT)
Age: 58
End: 2023-01-22

## 2023-01-28 ENCOUNTER — NON-APPOINTMENT (OUTPATIENT)
Age: 58
End: 2023-01-28

## 2023-02-12 ENCOUNTER — NON-APPOINTMENT (OUTPATIENT)
Age: 58
End: 2023-02-12

## 2023-07-10 ENCOUNTER — NON-APPOINTMENT (OUTPATIENT)
Age: 58
End: 2023-07-10

## 2023-08-19 ENCOUNTER — NON-APPOINTMENT (OUTPATIENT)
Age: 58
End: 2023-08-19

## 2024-05-07 DIAGNOSIS — M79.643 PAIN IN UNSPECIFIED HAND: ICD-10-CM

## 2024-05-07 DIAGNOSIS — M79.646 PAIN IN UNSPECIFIED HAND: ICD-10-CM

## 2024-05-08 ENCOUNTER — APPOINTMENT (OUTPATIENT)
Dept: ORTHOPEDIC SURGERY | Facility: CLINIC | Age: 59
End: 2024-05-08
Payer: COMMERCIAL

## 2024-05-08 VITALS
HEART RATE: 71 BPM | SYSTOLIC BLOOD PRESSURE: 114 MMHG | TEMPERATURE: 98.1 F | DIASTOLIC BLOOD PRESSURE: 72 MMHG | HEIGHT: 65 IN | BODY MASS INDEX: 24.16 KG/M2 | WEIGHT: 145 LBS

## 2024-05-08 DIAGNOSIS — M79.642 PAIN IN LEFT HAND: ICD-10-CM

## 2024-05-08 DIAGNOSIS — M65.312 TRIGGER THUMB, LEFT THUMB: ICD-10-CM

## 2024-05-08 DIAGNOSIS — M65.30 TRIGGER FINGER, UNSPECIFIED FINGER: ICD-10-CM

## 2024-05-08 PROCEDURE — 20550 NJX 1 TENDON SHEATH/LIGAMENT: CPT | Mod: LT

## 2024-05-08 PROCEDURE — 99204 OFFICE O/P NEW MOD 45 MIN: CPT | Mod: 25

## 2024-05-08 PROCEDURE — 73130 X-RAY EXAM OF HAND: CPT | Mod: 50

## 2024-05-08 NOTE — ASSESSMENT
[FreeTextEntry1] : ASSESSMENT: The patient comes in today with chronic recurring exacerbated symptoms of tendinopathy with significant triggering of the left thumb.  With this in mind we have discussed treatment modalities and she elects for an injection   The patient was adequately and thoroughly informed of my assessment of their current condition(s).  - This may diminish bodily function for the extremity. We discussed prognosis, tx modalities including operative and nonoperative options for the above diagnostic assessment. As always, 2nd opinion is always provided as an option.  When accessible, I was able to review other physicians note(s) including reviewing other tests, imaging results as well as personally view these results for my own interpretation.   Injection:   The risks and benefits of a steroid injection were discussed in detail. The risks include but are not limited to: pain, infection, swelling, flare response, bleeding, subcutaneous fat atrophy, skin depigmentation and/or elevation of blood sugar. The risk of incomplete resolution of symptoms, recurrence and additional intervention was reviewed and considered by the patient. The patient agreed to proceed and under a sterile prep, I injected 1 unit 6mg into 1 cc of a combination of Celestone and Lidocaine into the left thumb A1. The patient tolerated the injection well. The patient was adequately and thoroughly informed of my assessment of their current condition(s).  DISCUSSION: 1.  Injection as above.  Activity modification. 2. [x] 3. [x]

## 2024-05-08 NOTE — HISTORY OF PRESENT ILLNESS
[FreeTextEntry1] : Kendra is a pleasant 58-year-old female who presents today with a chronic history of left hand discomfort she describes triggering and difficulty with daily activities including riding her bicycle and cycling

## 2024-05-08 NOTE — PHYSICAL EXAM
[de-identified] : Examination of the hand(s)  particularly at the A1 of the left thumb reveals tenderness with a palpable click. [de-identified] : [4] views of [bilateral hands and wrists] were obtained today in my office and were seen by me and discussed with the patient.  These [show findings consistent with bilateral basal joint OA and findings of IP joint OA]

## 2024-05-14 ENCOUNTER — APPOINTMENT (OUTPATIENT)
Dept: ORTHOPEDIC SURGERY | Facility: CLINIC | Age: 59
End: 2024-05-14

## 2024-05-15 ENCOUNTER — APPOINTMENT (OUTPATIENT)
Dept: ORTHOPEDIC SURGERY | Facility: CLINIC | Age: 59
End: 2024-05-15

## 2024-05-25 ENCOUNTER — NON-APPOINTMENT (OUTPATIENT)
Age: 59
End: 2024-05-25

## 2024-08-05 ENCOUNTER — APPOINTMENT (OUTPATIENT)
Dept: ORTHOPEDIC SURGERY | Facility: CLINIC | Age: 59
End: 2024-08-05

## 2024-08-05 PROCEDURE — 99214 OFFICE O/P EST MOD 30 MIN: CPT | Mod: 25

## 2024-08-05 PROCEDURE — 20550 NJX 1 TENDON SHEATH/LIGAMENT: CPT | Mod: LT

## 2024-08-05 NOTE — HISTORY OF PRESENT ILLNESS
[FreeTextEntry1] : Kendra is a pleasant 58-year-old female who presents today for follow up with a chronic history of left hand discomfort. She describes triggering and difficulty with daily activities including riding her bicycle and cycling. She describes tremendous relief with her injection last visit, however symptoms have returned.

## 2024-08-05 NOTE — PHYSICAL EXAM
[de-identified] : Examination of the hand(s) particularly at the A1 of the left thumb reveals tenderness with a palpable click. [de-identified] : [4] views of [bilateral hands and wrists] were reviewed today in my office and were seen by me and discussed with the patient. These [show findings consistent with bilateral basal joint OA and findings of IP joint OA].

## 2024-08-05 NOTE — ASSESSMENT
[FreeTextEntry1] : ASSESSMENT: The patient comes in today for follow up with a chronic history of left hand discomfort. She describes triggering and difficulty with daily activities including riding her bicycle and cycling.  She describes tremendous relief with her injection last visit, however symptoms have returned.  Symptoms today are consistent with left trigger thumb.  Treatment modalities were discussed, the patient elects for a repeat injection.   The patient was adequately and thoroughly informed of my assessment of their current condition(s).  - This may diminish bodily function for the extremity.  We discussed prognosis, treatment modalities including operative and nonoperative options for the above diagnostic assessment. As always, 2nd opinion is always provided as an option. For this, when accessible, I was able to review other physicians note(s) including reviewing other tests, imaging results as well as personally view these results for my own interpretation.      Injection:   The risks and benefits of a steroid injection were discussed in detail. The risks include but are not limited to: pain, infection, swelling, flare response, bleeding, subcutaneous fat atrophy, skin depigmentation and/or elevation of blood sugar. The risk of incomplete resolution of symptoms, recurrence and additional intervention was reviewed and considered by the patient.  The patient agreed to proceed and under a sterile prep, I injected 1 unit (6mg) into 1 cc of a combination of Celestone and Lidocaine into the [left trigger thumb]. The patient tolerated the injection well.   The patient was adequately and thoroughly informed of my assessment of their current condition(s).   DISCUSSION: 1.  Injection as above. Activity modifications. She may follow-up as needed. 2. [x] 3. [x]

## 2024-10-26 ENCOUNTER — NON-APPOINTMENT (OUTPATIENT)
Age: 59
End: 2024-10-26

## 2024-12-18 ENCOUNTER — APPOINTMENT (OUTPATIENT)
Dept: ORTHOPEDIC SURGERY | Facility: CLINIC | Age: 59
End: 2024-12-18
Payer: COMMERCIAL

## 2024-12-18 DIAGNOSIS — M79.643 PAIN IN UNSPECIFIED HAND: ICD-10-CM

## 2024-12-18 DIAGNOSIS — M65.30 TRIGGER FINGER, UNSPECIFIED FINGER: ICD-10-CM

## 2024-12-18 DIAGNOSIS — M25.542 PAIN IN JOINTS OF LEFT HAND: ICD-10-CM

## 2024-12-18 DIAGNOSIS — M79.646 PAIN IN UNSPECIFIED HAND: ICD-10-CM

## 2024-12-18 PROCEDURE — 99214 OFFICE O/P EST MOD 30 MIN: CPT | Mod: 25

## 2024-12-18 PROCEDURE — 20600 DRAIN/INJ JOINT/BURSA W/O US: CPT | Mod: F4

## 2024-12-18 PROCEDURE — 20550 NJX 1 TENDON SHEATH/LIGAMENT: CPT | Mod: LT

## 2025-01-03 ENCOUNTER — NON-APPOINTMENT (OUTPATIENT)
Age: 60
End: 2025-01-03

## 2025-02-21 ENCOUNTER — APPOINTMENT (OUTPATIENT)
Dept: ORTHOPEDIC SURGERY | Facility: CLINIC | Age: 60
End: 2025-02-21

## 2025-02-26 ENCOUNTER — APPOINTMENT (OUTPATIENT)
Dept: ORTHOPEDIC SURGERY | Facility: CLINIC | Age: 60
End: 2025-02-26
Payer: COMMERCIAL

## 2025-02-26 DIAGNOSIS — M25.542 PAIN IN JOINTS OF LEFT HAND: ICD-10-CM

## 2025-02-26 DIAGNOSIS — M79.646 PAIN IN UNSPECIFIED HAND: ICD-10-CM

## 2025-02-26 DIAGNOSIS — M79.643 PAIN IN UNSPECIFIED HAND: ICD-10-CM

## 2025-02-26 DIAGNOSIS — M65.319 TRIGGER THUMB, UNSPECIFIED THUMB: ICD-10-CM

## 2025-02-26 DIAGNOSIS — M65.90 UNSPECIFIED SYNOVITIS AND TENOSYNOVITIS, UNSPECIFIED SITE: ICD-10-CM

## 2025-02-26 PROCEDURE — 20550 NJX 1 TENDON SHEATH/LIGAMENT: CPT | Mod: 59,LT

## 2025-02-26 PROCEDURE — 20600 DRAIN/INJ JOINT/BURSA W/O US: CPT | Mod: 59,LT

## 2025-02-26 PROCEDURE — 99214 OFFICE O/P EST MOD 30 MIN: CPT | Mod: 25

## 2025-04-09 ENCOUNTER — NON-APPOINTMENT (OUTPATIENT)
Age: 60
End: 2025-04-09

## 2025-04-30 ENCOUNTER — APPOINTMENT (OUTPATIENT)
Dept: ORTHOPEDIC SURGERY | Facility: CLINIC | Age: 60
End: 2025-04-30

## 2025-04-30 DIAGNOSIS — M15.2 BOUCHARD'S NODES (WITH ARTHROPATHY): ICD-10-CM

## 2025-04-30 DIAGNOSIS — M25.642 STIFFNESS OF LEFT HAND, NOT ELSEWHERE CLASSIFIED: ICD-10-CM

## 2025-04-30 DIAGNOSIS — M65.312 TRIGGER THUMB, LEFT THUMB: ICD-10-CM

## 2025-04-30 PROCEDURE — 99214 OFFICE O/P EST MOD 30 MIN: CPT | Mod: 25

## 2025-04-30 PROCEDURE — 20550 NJX 1 TENDON SHEATH/LIGAMENT: CPT | Mod: 59,LT

## 2025-04-30 PROCEDURE — 20600 DRAIN/INJ JOINT/BURSA W/O US: CPT | Mod: LT,59

## 2025-07-30 ENCOUNTER — NON-APPOINTMENT (OUTPATIENT)
Age: 60
End: 2025-07-30